# Patient Record
Sex: MALE | Race: ASIAN | NOT HISPANIC OR LATINO | Employment: STUDENT | URBAN - METROPOLITAN AREA
[De-identification: names, ages, dates, MRNs, and addresses within clinical notes are randomized per-mention and may not be internally consistent; named-entity substitution may affect disease eponyms.]

---
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
## 2023-09-14 ENCOUNTER — OFFICE VISIT (OUTPATIENT)
Dept: URGENT CARE | Facility: CLINIC | Age: 23
End: 2023-09-14
Payer: COMMERCIAL

## 2023-09-14 VITALS
DIASTOLIC BLOOD PRESSURE: 83 MMHG | OXYGEN SATURATION: 96 % | TEMPERATURE: 98 F | SYSTOLIC BLOOD PRESSURE: 137 MMHG | HEIGHT: 69 IN | BODY MASS INDEX: 31.1 KG/M2 | WEIGHT: 210 LBS | RESPIRATION RATE: 18 BRPM | HEART RATE: 60 BPM

## 2023-09-14 DIAGNOSIS — M25.572 ACUTE LEFT ANKLE PAIN: ICD-10-CM

## 2023-09-14 DIAGNOSIS — M79.671 ARCH PAIN OF RIGHT FOOT: Primary | ICD-10-CM

## 2023-09-14 DIAGNOSIS — M79.672 ARCH PAIN OF LEFT FOOT: ICD-10-CM

## 2023-09-14 PROCEDURE — 73610 X-RAY EXAM OF ANKLE: CPT | Mod: FY,LT,S$GLB, | Performed by: RADIOLOGY

## 2023-09-14 PROCEDURE — 73630 X-RAY EXAM OF FOOT: CPT | Mod: FY,RT,S$GLB, | Performed by: RADIOLOGY

## 2023-09-14 PROCEDURE — 73610 XR ANKLE COMPLETE 3 VIEW LEFT: ICD-10-PCS | Mod: FY,LT,S$GLB, | Performed by: RADIOLOGY

## 2023-09-14 PROCEDURE — 73630 X-RAY EXAM OF FOOT: CPT | Mod: FY,LT,S$GLB, | Performed by: RADIOLOGY

## 2023-09-14 PROCEDURE — 99203 OFFICE O/P NEW LOW 30 MIN: CPT | Mod: S$GLB,,, | Performed by: NURSE PRACTITIONER

## 2023-09-14 PROCEDURE — 99203 PR OFFICE/OUTPT VISIT, NEW, LEVL III, 30-44 MIN: ICD-10-PCS | Mod: S$GLB,,, | Performed by: NURSE PRACTITIONER

## 2023-09-14 PROCEDURE — 73630 XR FOOT COMPLETE 3 VIEW LEFT: ICD-10-PCS | Mod: FY,LT,S$GLB, | Performed by: RADIOLOGY

## 2023-09-14 NOTE — PROGRESS NOTES
"Subjective:      Patient ID: Ashley Munguia is a 23 y.o. male.    Vitals:  height is 5' 9" (1.753 m) and weight is 95.3 kg (210 lb). His temperature is 98.2 °F (36.8 °C). His blood pressure is 137/83 and his pulse is 60. His respiration is 18 and oxygen saturation is 96%.     Chief Complaint: Foot Injury        Pt is a 22 yo male presenting with left ankle pain and chronic bilateral foot pain.  Onset of ankle pain was 3 weeks ago.  Pt says that when he is running, the pain is exacerbated.    Sx include bilateral foot pain in his bottom arch and is described as tearing pain    Pt has Hx of twisting ankle of right foot leading to stretching of right ankle ligaments.      Foot Injury   The incident occurred more than 1 week ago. There was no injury mechanism. The pain is present in the right foot. The quality of the pain is described as aching and shooting. The pain has been Fluctuating since onset. Associated symptoms include muscle weakness. Associated symptoms comments: Weakness when pt is exercising.. He reports no foreign bodies present. The symptoms are aggravated by movement. He has tried nothing for the symptoms.     ROS   Objective:     Physical Exam   Constitutional: He is oriented to person, place, and time.   Eyes: EOM are normal.   Cardiovascular: Normal rate and regular rhythm.   Pulmonary/Chest: Effort normal and breath sounds normal. No respiratory distress.   Musculoskeletal:      Right foot: Normal range of motion and normal capillary refill. No tenderness or swelling. Comments: Pain at arch worsened with ambulation      Left foot: Normal range of motion and normal capillary refill. No tenderness or swelling. Comments: Pain at arch worsened with ambulation.  Pain at posterior ankle (medially and distally)        Feet:    Neurological: He is alert and oriented to person, place, and time.   Skin: Skin is warm and dry.   Nursing note and vitals reviewed.      Assessment:     1. Arch pain of right foot  "   2. Arch pain of left foot    3. Acute left ankle pain        Plan:       Arch pain of right foot  -     XR FOOT COMPLETE 3 VIEW RIGHT; Future; Expected date: 09/14/2023    Arch pain of left foot  -     XR FOOT COMPLETE 3 VIEW LEFT; Future; Expected date: 09/14/2023    Acute left ankle pain  -     XR ANKLE COMPLETE 3 VIEW LEFT; Future; Expected date: 09/14/2023  -     Ambulatory referral/consult to Podiatry      Patient Instructions     Arch pain of right foot  -     XR FOOT COMPLETE 3 VIEW RIGHT    Arch pain of left foot  -     XR FOOT COMPLETE 3 VIEW LEFT    Acute left ankle pain  -     XR ANKLE COMPLETE 3 VIEW LEFT    -     Ambulatory referral/consult to Podiatry  Referral ordered.  Call [phone] to schedule appt       Rest - Rest the injured area      Ice - Apply ice  to affected area for the first 24-48 hours (DO NOT APPLY ICE DIRECTLY TO THE SKIN.  DO NOT LEAVE ON AFFECTED BODY PART FOR MORE THAN 15 MINUTES AT AT TIME TO AVOID INJURY TO SOFT TISSUE)      Compression - Wear ACE wrap or splint provided for compression and comfort to help reduce pain and swelling     Elevate - Elevated affected area higher than your heart to reduce swelling     - Tylenol or Ibuprofen as directed as needed for pain.    Take ibuprofen 600-800 mg every 6-8 hours for pain and inflammation.  Do not take ibuprofen if you have a history of GI bleeding, kidney disease, or if you take blood thinners.    You can also take Tylenol/acetaminophen 650-1000 mg every 6-8 hours for added pain relief. Avoid tylenol if you have a history of liver disease.      Follow up with your PCP in 1 week or as needed if no improvement.      If your condition worsens or fails to improve we recommend that you receive another evaluation at the ER immediately or contact your PCP to discuss your concerns or return here.

## 2023-09-14 NOTE — LETTER
September 14, 2023      [facility]  [address] [place] [address]  Phone: [phone]  Fax: [phone]       Patient: Ashley Munguia   YOB: 2000  Date of Visit: 09/14/2023    To Whom It May Concern:    Maru Munguia  was at [facility] on 09/14/2023. The patient may return to work/school on 9/15/2023 with restrictions.   No exercise of left foot/ankle until cleared by Podiatry.  If you have any questions or concerns, or if I can be of further assistance, please do not hesitate to contact me.    Sincerely,    ARLINE ChavezC

## 2023-09-14 NOTE — PATIENT INSTRUCTIONS
Arch pain of right foot  -     XR FOOT COMPLETE 3 VIEW RIGHT    Arch pain of left foot  -     XR FOOT COMPLETE 3 VIEW LEFT    Acute left ankle pain  -     XR ANKLE COMPLETE 3 VIEW LEFT    -     Ambulatory referral/consult to Podiatry  Referral ordered.  Call [phone] to schedule appt       Rest - Rest the injured area      Ice - Apply ice  to affected area for the first 24-48 hours (DO NOT APPLY ICE DIRECTLY TO THE SKIN.  DO NOT LEAVE ON AFFECTED BODY PART FOR MORE THAN 15 MINUTES AT AT TIME TO AVOID INJURY TO SOFT TISSUE)      Compression - Wear ACE wrap or splint provided for compression and comfort to help reduce pain and swelling     Elevate - Elevated affected area higher than your heart to reduce swelling     - Tylenol or Ibuprofen as directed as needed for pain.    Take ibuprofen 600-800 mg every 6-8 hours for pain and inflammation.  Do not take ibuprofen if you have a history of GI bleeding, kidney disease, or if you take blood thinners.    You can also take Tylenol/acetaminophen 650-1000 mg every 6-8 hours for added pain relief. Avoid tylenol if you have a history of liver disease.      Follow up with your PCP in 1 week or as needed if no improvement.      If your condition worsens or fails to improve we recommend that you receive another evaluation at the ER immediately or contact your PCP to discuss your concerns or return here.

## 2023-09-21 ENCOUNTER — OFFICE VISIT (OUTPATIENT)
Dept: PODIATRY | Facility: CLINIC | Age: 23
End: 2023-09-21
Payer: COMMERCIAL

## 2023-09-21 VITALS
SYSTOLIC BLOOD PRESSURE: 117 MMHG | DIASTOLIC BLOOD PRESSURE: 75 MMHG | WEIGHT: 213.38 LBS | HEIGHT: 69 IN | TEMPERATURE: 98 F | BODY MASS INDEX: 31.6 KG/M2 | HEART RATE: 75 BPM

## 2023-09-21 DIAGNOSIS — M79.2 NEUROGENIC PAIN OF RIGHT FOOT: Primary | ICD-10-CM

## 2023-09-21 DIAGNOSIS — M79.2 NEUROGENIC PAIN OF LEFT FOOT: ICD-10-CM

## 2023-09-21 PROCEDURE — 99204 PR OFFICE/OUTPT VISIT, NEW, LEVL IV, 45-59 MIN: ICD-10-PCS | Mod: S$GLB,,, | Performed by: STUDENT IN AN ORGANIZED HEALTH CARE EDUCATION/TRAINING PROGRAM

## 2023-09-21 PROCEDURE — 3078F PR MOST RECENT DIASTOLIC BLOOD PRESSURE < 80 MM HG: ICD-10-PCS | Mod: CPTII,S$GLB,, | Performed by: STUDENT IN AN ORGANIZED HEALTH CARE EDUCATION/TRAINING PROGRAM

## 2023-09-21 PROCEDURE — 3008F BODY MASS INDEX DOCD: CPT | Mod: CPTII,S$GLB,, | Performed by: STUDENT IN AN ORGANIZED HEALTH CARE EDUCATION/TRAINING PROGRAM

## 2023-09-21 PROCEDURE — 99999 PR PBB SHADOW E&M-EST. PATIENT-LVL III: CPT | Mod: PBBFAC,,, | Performed by: STUDENT IN AN ORGANIZED HEALTH CARE EDUCATION/TRAINING PROGRAM

## 2023-09-21 PROCEDURE — 3074F SYST BP LT 130 MM HG: CPT | Mod: CPTII,S$GLB,, | Performed by: STUDENT IN AN ORGANIZED HEALTH CARE EDUCATION/TRAINING PROGRAM

## 2023-09-21 PROCEDURE — 1159F PR MEDICATION LIST DOCUMENTED IN MEDICAL RECORD: ICD-10-PCS | Mod: CPTII,S$GLB,, | Performed by: STUDENT IN AN ORGANIZED HEALTH CARE EDUCATION/TRAINING PROGRAM

## 2023-09-21 PROCEDURE — 3074F PR MOST RECENT SYSTOLIC BLOOD PRESSURE < 130 MM HG: ICD-10-PCS | Mod: CPTII,S$GLB,, | Performed by: STUDENT IN AN ORGANIZED HEALTH CARE EDUCATION/TRAINING PROGRAM

## 2023-09-21 PROCEDURE — 1159F MED LIST DOCD IN RCRD: CPT | Mod: CPTII,S$GLB,, | Performed by: STUDENT IN AN ORGANIZED HEALTH CARE EDUCATION/TRAINING PROGRAM

## 2023-09-21 PROCEDURE — 99999 PR PBB SHADOW E&M-EST. PATIENT-LVL III: ICD-10-PCS | Mod: PBBFAC,,, | Performed by: STUDENT IN AN ORGANIZED HEALTH CARE EDUCATION/TRAINING PROGRAM

## 2023-09-21 PROCEDURE — 3078F DIAST BP <80 MM HG: CPT | Mod: CPTII,S$GLB,, | Performed by: STUDENT IN AN ORGANIZED HEALTH CARE EDUCATION/TRAINING PROGRAM

## 2023-09-21 PROCEDURE — 3008F PR BODY MASS INDEX (BMI) DOCUMENTED: ICD-10-PCS | Mod: CPTII,S$GLB,, | Performed by: STUDENT IN AN ORGANIZED HEALTH CARE EDUCATION/TRAINING PROGRAM

## 2023-09-21 PROCEDURE — 99204 OFFICE O/P NEW MOD 45 MIN: CPT | Mod: S$GLB,,, | Performed by: STUDENT IN AN ORGANIZED HEALTH CARE EDUCATION/TRAINING PROGRAM

## 2023-09-21 RX ORDER — DEXAMETHASONE 4 MG/1
4 TABLET ORAL DAILY
Qty: 7 TABLET | Refills: 0 | Status: SHIPPED | OUTPATIENT
Start: 2023-09-21

## 2023-09-21 RX ORDER — DEXAMETHASONE 4 MG/1
4 TABLET ORAL DAILY
Qty: 7 TABLET | Refills: 0 | Status: SHIPPED | OUTPATIENT
Start: 2023-09-21 | End: 2023-09-21

## 2023-09-21 RX ORDER — DULOXETIN HYDROCHLORIDE 30 MG/1
30 CAPSULE, DELAYED RELEASE ORAL DAILY
Qty: 30 CAPSULE | Refills: 11 | Status: SHIPPED | OUTPATIENT
Start: 2023-09-21 | End: 2024-09-20

## 2023-09-21 RX ORDER — DULOXETIN HYDROCHLORIDE 30 MG/1
30 CAPSULE, DELAYED RELEASE ORAL DAILY
Qty: 30 CAPSULE | Refills: 11 | Status: SHIPPED | OUTPATIENT
Start: 2023-09-21 | End: 2023-09-21

## 2023-09-21 NOTE — PROGRESS NOTES
Subjective:     Patient    Ashley Munguia is a 23 y.o. male.    Problem    Referred by urgent care. New to [facility]. 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 History of bilateral ankle sprains. The pain is primarily in the bottoms of both feet, worse when running. Improved by rest, returns when he runs. Tries to run about 2 miles/session, but pain starts at about 1/2-1 mile. Runs in [place]. Prescribed ibuprofen and acetaminophen by the ED, does not regularly take medicine for pain. Feels like his feet are splitting in half. Denies numbness, tingling, burning. Admits to neck pain, upper back pain.     History    History obtained from patient and review of medical records.     No past medical history on file.    No past surgical history on file.     Objective:     Vitals  Wt Readings from Last 1 Encounters:   09/21/23 96.8 kg (213 lb 6.5 oz)     Temp Readings from Last 1 Encounters:   09/21/23 98.1 °F (36.7 °C) (Oral)     BP Readings from Last 1 Encounters:   09/21/23 117/75     Pulse Readings from Last 1 Encounters:   09/21/23 75       Dermatological Exam    Skin:  Pedal hair growth, skin color, and skin texture normal on right  Pedal hair growth, skin color, and skin texture normal on left    Nails:  All nails normal in length, thickness, color    Vascular Exam    Arteries:  Posterior tibial artery palpable on right  Dorsalis pedis artery palpable on right  Posterior tibial artery palpable on left  Dorsalis pedis artery palpable on left    Veins:  Superficial veins unremarkable on right  Superficial veins unremarkable on left    Swelling:  None on right  None on left    Neurological Exam    Newman touch test:  6/6 sites sensed, light touch intact    Provocation Sign:  + at tibial nerve, deep fibular nerve, and sural nerve bilaterally    Tinel Sign:  + at tibial nerve on right    Slump Test:  -  bilaterally    Musculoskeletal Exam    Footwear:  Athletic on right  Athletic on left    Gait Exam:   Ambulatory Status: Ambulatory  Gait: Normal  Assistive Devices: None    Foot Progression Angle:  Normal on right  Normal on left     Right Lower Extremity Additional Findings:  Pain on palpation anterior ankle joint, sinus tarsi, plantar medial arch  Negative windlass test  Right foot and ankle function, strength, and range of motion unremarkable except as noted above.     Left Lower Extremity Additional Findings:  Pain on palpation anterior ankle joint, sinus tarsi, plantar medial arch  Negative windlass test  Left foot and ankle function, strength, and range of motion unremarkable except as noted above.    Imaging and Other Tests    Imaging:  Independently reviewed and interpreted imaging, findings are as follows:   09/14/23 bilateral foot and left ankle X rays: no fracture, dislocation, other acute findings; no significant chronic findings     Assessment:     Encounter Diagnoses   Name Primary?    Neurogenic pain of right foot Yes    Neurogenic pain of left foot         Plan:     I counseled the patient on his conditions, their implications and medical management.    Neurogenic pain of both feet: chronic stable  -Diffuse peripheral sensitization, may be spinal in origin.  -Ordered dexamethasone, ordered duloxetine.   -Will titrate/adjust nerve pain medications as needed.  -If not improving on nerve pain medications then will consider spinal PT/PMR, foot MRIs.     Return to clinic in 2-3 weeks for nerve medication titration, call sooner PRN.